# Patient Record
Sex: MALE | Race: BLACK OR AFRICAN AMERICAN
[De-identification: names, ages, dates, MRNs, and addresses within clinical notes are randomized per-mention and may not be internally consistent; named-entity substitution may affect disease eponyms.]

---

## 2019-06-27 ENCOUNTER — HOSPITAL ENCOUNTER (EMERGENCY)
Dept: HOSPITAL 76 - ED | Age: 19
Discharge: HOME | End: 2019-06-27
Payer: COMMERCIAL

## 2019-06-27 ENCOUNTER — HOSPITAL ENCOUNTER (OUTPATIENT)
Dept: HOSPITAL 76 - EMS | Age: 19
Discharge: ANOTHER HOSPITAL | End: 2019-06-27
Attending: SURGERY
Payer: COMMERCIAL

## 2019-06-27 VITALS — SYSTOLIC BLOOD PRESSURE: 157 MMHG | DIASTOLIC BLOOD PRESSURE: 88 MMHG

## 2019-06-27 DIAGNOSIS — R00.0: ICD-10-CM

## 2019-06-27 DIAGNOSIS — R09.02: ICD-10-CM

## 2019-06-27 DIAGNOSIS — Z72.0: ICD-10-CM

## 2019-06-27 DIAGNOSIS — R06.00: Primary | ICD-10-CM

## 2019-06-27 DIAGNOSIS — J18.9: Primary | ICD-10-CM

## 2019-06-27 DIAGNOSIS — I49.1: ICD-10-CM

## 2019-06-27 DIAGNOSIS — R07.9: ICD-10-CM

## 2019-06-27 DIAGNOSIS — R19.7: ICD-10-CM

## 2019-06-27 LAB
ALBUMIN DIAFP-MCNC: 4.3 G/DL (ref 3.2–5.5)
ALBUMIN/GLOB SERPL: 1.2 {RATIO} (ref 1–2.2)
ALP SERPL-CCNC: 46 IU/L (ref 42–121)
ALT SERPL W P-5'-P-CCNC: 20 IU/L (ref 10–60)
ANION GAP SERPL CALCULATED.4IONS-SCNC: 9 MMOL/L (ref 6–13)
AST SERPL W P-5'-P-CCNC: 22 IU/L (ref 10–42)
BASOPHILS NFR BLD AUTO: 0.1 10^3/UL (ref 0–0.1)
BASOPHILS NFR BLD AUTO: 0.8 %
BILIRUB BLD-MCNC: 1.4 MG/DL (ref 0.2–1)
BUN SERPL-MCNC: 14 MG/DL (ref 6–20)
CALCIUM UR-MCNC: 9.4 MG/DL (ref 8.5–10.3)
CHLORIDE SERPL-SCNC: 105 MMOL/L (ref 101–111)
CO2 SERPL-SCNC: 24 MMOL/L (ref 21–32)
CREAT SERPLBLD-SCNC: 1.1 MG/DL (ref 0.6–1.2)
EOSINOPHIL # BLD AUTO: 0.2 10^3/UL (ref 0–0.7)
EOSINOPHIL NFR BLD AUTO: 1.3 %
ERYTHROCYTE [DISTWIDTH] IN BLOOD BY AUTOMATED COUNT: 12.8 % (ref 12–15)
GFRSERPLBLD MDRD-ARVRAT: 103 ML/MIN/{1.73_M2} (ref 89–?)
GLOBULIN SER-MCNC: 3.7 G/DL (ref 2.1–4.2)
GLUCOSE SERPL-MCNC: 97 MG/DL (ref 70–100)
HGB UR QL STRIP: 16.3 G/DL (ref 14–18)
LIPASE SERPL-CCNC: 24 U/L (ref 22–51)
LYMPHOCYTES # SPEC AUTO: 2.2 10^3/UL (ref 1.5–3.5)
LYMPHOCYTES NFR BLD AUTO: 12.6 %
MCH RBC QN AUTO: 31.2 PG (ref 27–31)
MCHC RBC AUTO-ENTMCNC: 36 G/DL (ref 32–36)
MCV RBC AUTO: 86.6 FL (ref 80–94)
MONOCYTES # BLD AUTO: 0.7 10^3/UL (ref 0–1)
MONOCYTES NFR BLD AUTO: 4.3 %
NEUTROPHILS # BLD AUTO: 13.8 10^3/UL (ref 1.5–6.6)
NEUTROPHILS # SNV AUTO: 17.2 X10^3/UL (ref 4.8–10.8)
NEUTROPHILS NFR BLD AUTO: 80.4 %
PDW BLD AUTO: 12.2 FL (ref 7.4–11.4)
PLATELET # BLD: 262 10^3/UL (ref 130–450)
PROT SPEC-MCNC: 8 G/DL (ref 6.7–8.2)
RBC MAR: 5.23 10^6/UL (ref 4.7–6.1)
SODIUM SERPLBLD-SCNC: 138 MMOL/L (ref 135–145)

## 2019-06-27 PROCEDURE — 96365 THER/PROPH/DIAG IV INF INIT: CPT

## 2019-06-27 PROCEDURE — 84484 ASSAY OF TROPONIN QUANT: CPT

## 2019-06-27 PROCEDURE — 96367 TX/PROPH/DG ADDL SEQ IV INF: CPT

## 2019-06-27 PROCEDURE — 93005 ELECTROCARDIOGRAM TRACING: CPT

## 2019-06-27 PROCEDURE — 80053 COMPREHEN METABOLIC PANEL: CPT

## 2019-06-27 PROCEDURE — 83690 ASSAY OF LIPASE: CPT

## 2019-06-27 PROCEDURE — 85025 COMPLETE CBC W/AUTO DIFF WBC: CPT

## 2019-06-27 PROCEDURE — 71046 X-RAY EXAM CHEST 2 VIEWS: CPT

## 2019-06-27 PROCEDURE — 36415 COLL VENOUS BLD VENIPUNCTURE: CPT

## 2019-06-27 PROCEDURE — 96375 TX/PRO/DX INJ NEW DRUG ADDON: CPT

## 2019-06-27 PROCEDURE — 71275 CT ANGIOGRAPHY CHEST: CPT

## 2019-06-27 PROCEDURE — 94640 AIRWAY INHALATION TREATMENT: CPT

## 2019-06-27 PROCEDURE — 99284 EMERGENCY DEPT VISIT MOD MDM: CPT

## 2019-06-27 NOTE — CT REPORT
Reason:  SOB, hypoxia, concern for PE

Procedure Date:  06/27/2019   

Accession Number:  [id number] / [id number]                    

Procedure:  CT  - ANGIO CHEST W/WO CPT Code:  

 

FULL RESULT:

 

 

EXAM:

CT ANGIOGRAM CHEST

 

EXAM DATE: 6/27/2019 03:55 PM.

 

CLINICAL HISTORY: Shortness of breath. Hypoxia.

 

COMPARISON: CHEST 2 VIEW 06/27/2019 1:05 PM.

 

TECHNIQUE: Routine helical imaging was performed through the chest in the 

pulmonary arterial phase. IV Contrast: 70 cc of Optiray 320. 

Reconstructions: Coronal 3-D MIP reconstructions.Sagittal and coronal.

 

In accordance with CT protocol optimization, one or more of the following 

dose reduction techniques were utilized for this exam: automated exposure 

control, adjustment of mA and/or KV based on patient size, or use of 

iterative reconstructive technique.

 

FINDINGS:

Pulmonary Arteries:

Diagnostic quality: Adequate through the segmental arteries. No evidence 

for acute or chronic pulmonary emboli.

 

RV/LV is within normal limits. There is no interventricular septal 

bowing. There is no reflux of contrast material in the IVC.

 

Lungs/Pleura: Small patchy infiltrates in the medial perihilar right 

upper lobe, lingula, central right lower lobe and left lower lobe, and 

medial right middle lobe. No consolidation, nodules, or edema. No 

effusions or pneumothorax.

 

Mediastinum: Normal. No cardiac enlargement or adenopathy.

 

Thoracic Aorta: Unremarkable.

 

Upper Abdomen: Unremarkable.

 

Other: None.

IMPRESSION:

1. No pulmonary emboli.

2. No aortic aneurysm.

3. Patchy widespread bilateral pulmonary infiltrates.

 

[facility]

## 2019-06-27 NOTE — XRAY REPORT
Reason:  cough

Procedure Date:  06/27/2019   

Accession Number:  [id number] / [id number]                    

Procedure:  XR  - Chest 2 View X-Ray CPT Code:  75917

 

FULL RESULT:

 

 

EXAM:

CHEST RADIOGRAPHY

 

EXAM DATE: 6/27/2019 01:21 PM.

 

CLINICAL HISTORY: Cough.

 

COMPARISON: None.

 

TECHNIQUE: 2 views.

 

FINDINGS:

Lungs/Pleura: Bilateral airway thickening can be seen in the setting of 

bronchiolitis or reactive airways disease. No focal lung parenchymal 

consolidation identified to suggest superimposed pneumonia. No pleural 

effusion or pneumothorax.

 

Mediastinum: There is increasing density in both hilar regions suggestive 

of adenopathy. There is no cardiomegaly.

 

Other: None.

IMPRESSION: Radiograph suggestive of airway thickening and bilateral 

lymphadenopathy.

 

[facility]

## 2019-06-27 NOTE — ED PHYSICIAN DOCUMENTATION
History of Present Illness





- Stated complaint


Stated Complaint: DIFF BREATHING





- History obtained from


History obtained from: Patient





- History of Present Illness


Timing: Prior to arrival





- Additonal information


Additional information: 





Patient is a previously healthy 18-year-old male [place] 

with [place] breathing and mild hypoxia.  Patient denies any history of 

asthma or other pulmonary issues.  Patient states that he was generally feeling 

unwell over the last day or so and thought he may have had a panic attack last 

night and therefore [place] an appointment [place] 

[place].  Patient reports productive cough, difficulty breathing, but no fever, 

chills, or chest pain.  Patient also denies abdominal pain or urinary changes, 

but reports mild diarrhea.Patient received nebulizer treatment in route, which 

he reports improved his symptoms.No other improving or worsening factors noted.





Review of Systems


Constitutional: denies: Fever


Respiratory: reports: Dyspnea, Cough


GI: reports: Diarrhea.  denies: Abdominal Pain, Vomiting


: denies: Dysuria





PD PAST MEDICAL HISTORY





- Past Medical History


Past Medical History: No





- Past Surgical History


Past Surgical History: No





- Present Medications


Home Medications: 


                                Ambulatory Orders











 Medication  Instructions  Recorded  Confirmed


 


Azithromycin 250 mg PO DAILY 4 Days #4 tab 06/27/19 














- Allergies


Allergies/Adverse Reactions: 


                                    Allergies











Allergy/AdvReac Type Severity Reaction Status Date / Time


 


No Known Drug Allergies Allergy   Verified 06/27/19 12:42














PD ED PE NORMAL





- Vitals


Vital signs reviewed: Yes





- General


General: Alert and oriented X 3, No acute distress, Well developed/nourished, 

Other (Speaking in full sentences)





- HEENT


HEENT: Atraumatic, Moist mucous membranes, Pharynx benign





- Cardiac


Cardiac: RRR, No murmur





- Respiratory


Respiratory: No respiratory distress.  No: Clear bilaterally (Breath sounds 

present bilaterally but with significant expiratory wheezing throughout)





- Abdomen


Abdomen: Soft, Non tender, Non distended





- Derm


Derm: Normal color, Warm and dry, No rash





- Extremities


Extremities: No deformity, No tenderness to palpate





- Neuro


Neuro: Alert and oriented X 3, No motor deficit, No sensory deficit





- Psych


Psych: Normal mood, Normal affect





Results





- Vitals


Vitals: 


                               Vital Signs - 24 hr











  06/27/19 06/27/19 06/27/19





  12:39 13:20 13:24


 


Temperature 37.2 C  


 


Heart Rate 103 H 100 96


 


Respiratory 28 H 15 21





Rate   


 


Blood Pressure 156/75 H  


 


O2 Saturation 90 L 95 














  06/27/19 06/27/19 06/27/19





  14:20 14:34 14:39


 


Temperature 36.7 C  


 


Heart Rate 89 86 


 


Respiratory 20 19 





Rate   


 


Blood Pressure 146/61 H  


 


O2 Saturation 90 L  94














  06/27/19 06/27/19 06/27/19





  16:00 17:03 17:26


 


Temperature   


 


Heart Rate 93 89 


 


Respiratory 18 18 





Rate   


 


Blood Pressure 146/89 H 141/94 H 


 


O2 Saturation 93 92 92








                                     Oxygen











O2 Source                      Nasal cannula


 


Oxygen Flow Rate               3

















- EKG (time done)


  ** 1234


Rate: Rate (enter#) (103)


Rhythm: NSR


Ischemia: Non specific changes





- Labs


Labs: 


                                Laboratory Tests











  06/27/19 06/27/19 06/27/19





  14:30 14:30 14:30


 


WBC  17.2 H  


 


RBC  5.23  


 


Hgb  16.3  


 


Hct  45.3  


 


MCV  86.6  


 


MCH  31.2 H  


 


MCHC  36.0  


 


RDW  12.8  


 


Plt Count  262  


 


MPV  12.2 H  


 


Neut # (Auto)  13.8 H  


 


Lymph # (Auto)  2.2  


 


Mono # (Auto)  0.7  


 


Eos # (Auto)  0.2  


 


Baso # (Auto)  0.1  


 


Absolute Nucleated RBC  0.00  


 


Nucleated RBC %  0.0  


 


Sodium   138 


 


Potassium   3.8 


 


Chloride   105 


 


Carbon Dioxide   24 


 


Anion Gap   9.0 


 


BUN   14 


 


Creatinine   1.1 


 


Estimated GFR (MDRD)   103 


 


Glucose   97 


 


Calcium   9.4 


 


Total Bilirubin   1.4 H 


 


AST   22 


 


ALT   20 


 


Alkaline Phosphatase   46 


 


Troponin I    < 0.04


 


Total Protein   8.0 


 


Albumin   4.3 


 


Globulin   3.7 


 


Albumin/Globulin Ratio   1.2 


 


Lipase   24 














PD MEDICAL DECISION MAKING





- ED course


Complexity details: reviewed results, re-evaluated patient, considered 

differential, d/w patient


ED course: 





Patient presenting with hypoxia and difficulty breathing without known 

exposures, although patient later admits to usage of vape and tobacco.  Patient 

denies history of asthma, but has significant wheezing both inspiratory and 

expiratory on exam.  Patient received an nebulizer treatment in route and 

received several more in addition to magnesium and Solu-Medrol in the ED.  Chest

x-ray found inflammatory changes likely reactive.  Patient continued to require 

supplemental oxygen and given his continued knee, felt appropriate to further 

evaluate.  EKG did not find evidence of ischemic changes.  Screening lab work 

including troponin returned relatively unremarkable except for leukocytosis.  CT

angios chest obtained to further evaluate for etiologies including PE.  CT found

evidence of bilateral patchy infiltrates, but no PE.Patient started on both 

Cipro and azithromycin in the ED and eventually weaned off of oxygen.  Discussed

results and recommendations with patient including use of antibiotics at home, 

strict return precautions, and need for close follow-up with his primary care 

physician.  Patient voiced understanding and is comfortable with discharge plan.





Departure





- Departure


Disposition: 01 Home, Self Care


Clinical Impression: 


Pneumonia


Qualifiers:


 Pneumonia type: due to unspecified organism Laterality: bilateral Lung 

location: unspecified part of lung Qualified Code(s): J18.9 - Pneumonia, 

unspecified organism





Condition: Good


Instructions:  ED Pneumonia Adult


Follow-Up: 


your,doctor [Other] - Within 3 Days


Prescriptions: 


Azithromycin 250 mg PO DAILY 4 Days #4 tab


Comments: 


Please start taking azithromycin as prescribed tomorrow as you have received 

your antibiotics for today in the ED.  Please follow-up closely with your 

primary care physician in the next 1 to 2 days.  Return to ED sooner if 

experience any worsening symptoms or have other concerns.

## 2019-07-06 ENCOUNTER — HOSPITAL ENCOUNTER (EMERGENCY)
Dept: HOSPITAL 76 - ED | Age: 19
Discharge: HOME | End: 2019-07-06
Payer: COMMERCIAL

## 2019-07-06 VITALS — SYSTOLIC BLOOD PRESSURE: 137 MMHG | DIASTOLIC BLOOD PRESSURE: 90 MMHG

## 2019-07-06 DIAGNOSIS — J18.9: Primary | ICD-10-CM

## 2019-07-06 PROCEDURE — 94664 DEMO&/EVAL PT USE INHALER: CPT

## 2019-07-06 PROCEDURE — 94640 AIRWAY INHALATION TREATMENT: CPT

## 2019-07-06 PROCEDURE — 99284 EMERGENCY DEPT VISIT MOD MDM: CPT

## 2019-07-06 PROCEDURE — 71046 X-RAY EXAM CHEST 2 VIEWS: CPT

## 2019-07-06 PROCEDURE — 99283 EMERGENCY DEPT VISIT LOW MDM: CPT

## 2019-07-06 NOTE — ED PHYSICIAN DOCUMENTATION
PD HPI URI





- Stated complaint


Stated Complaint: SOA





- Chief complaint


Chief Complaint: Resp





- History obtained from


History obtained from: Patient





- History of Present Illness


Timing - onset: How many days ago (2 days of increased cough and trouble 

breathing after finishing abx. Had cough and dyspnea the past 10-14 days prior 

to that and was seen in ER a week ago, and Rx Zpack after Dx of pneumonia based 

on chest CT/CXR.)


Timing duration: Weeks (2)


Timing details: Gradual onset, Still present, Waxing and waning (was feeling 

better with abx but not all improved, and then symptoms again the past 2 days.)


Associated symptoms: Productive cough, Dyspnea.  No: Fever, Swollen nodes, 

Hemoptysis, NVD


Contributing factors: COPD / asthma.  No: Sick contact


Similar symptoms before: Has not had sx before


Recently seen: Emergency Dept (a week ago with Dx of pneumonia, with nebulizers 

x 3 in ER, and Rx zpack (no inhaler nor steroids))





Review of Systems


Constitutional: reports: Myalgias.  denies: Fever, Chills


Nose: reports: Congestion.  denies: Rhinorrhea / runny nose


Throat: denies: Sore throat


Cardiac: denies: Chest pain / pressure, Palpitations


Respiratory: reports: Dyspnea, Cough, Wheezing


GI: denies: Nausea, Vomiting, Diarrhea


Skin: denies: Rash, Lesions





PD PAST MEDICAL HISTORY





- Past Medical History


Respiratory: Pneumonia





- Past Surgical History


Past Surgical History: No





- Present Medications


Home Medications: 


                                Ambulatory Orders











 Medication  Instructions  Recorded  Confirmed


 


Albuterol Sulf [Ventolin Hfa 2 - 3 puffs INH Q4HR PRN #1 inhaler 07/06/19 





Inhaler]   


 


Benzonatate [Tessalon Perle] 100 mg PO TID PRN #20 capsule 07/06/19 


 


Doxycycline Hyclate 100 mg PO BID #14 capsule 07/06/19 


 


dexAMETHasone [Decadron] 4 mg PO DAILY #5 tablet 07/06/19 














- Allergies


Allergies/Adverse Reactions: 


                                    Allergies











Allergy/AdvReac Type Severity Reaction Status Date / Time


 


No Known Drug Allergies Allergy   Verified 07/06/19 09:04














- Social History


Does the pt smoke?: No


Smoking Status: Never smoker


Does the pt drink ETOH?: No


Does the pt have substance abuse?: No





- Immunizations


Immunizations are current?: Yes





- POLST


Patient has POLST: No





PD ED PE NORMAL





- Vitals


Vital signs reviewed: Yes





- General


General: Alert and oriented X 3, Well developed/nourished, Other (seems tight 

breathing and some work of breathing. )





- HEENT


HEENT: Ears normal, Pharynx benign





- Neck


Neck: Supple, no meningeal sign, No adenopathy





- Cardiac


Cardiac: RRR, No murmur





- Respiratory


Respiratory: No: Clear bilaterally (no coarse sounds, but with diffuse moderate 

wheezing with diminished tidal volume. )





- Derm


Derm: Normal color, Warm and dry





- Extremities


Extremities: No tenderness to palpate, Normal ROM s pain, No edema, No calf 

tenderness / cord





- Neuro


Neuro: Alert and oriented X 3, No motor deficit, Normal speech





Results





- Vitals


Vitals: 


                               Vital Signs - 24 hr











  07/06/19 07/06/19 07/06/19





  09:01 09:33 10:18


 


Temperature 36.8 C  


 


Heart Rate 85 89 83


 


Respiratory 20 24 22





Rate   


 


Blood Pressure 161/74 H 135/100 H 


 


O2 Saturation 97 95 














  07/06/19 07/06/19





  10:47 11:41


 


Temperature  


 


Heart Rate 83 79


 


Respiratory 20 20





Rate  


 


Blood Pressure  137/90 H


 


O2 Saturation  96








                                     Oxygen











O2 Source                      Room air

















- Rads (name of study)


  ** chest xray


Radiology: Prelim report reviewed (no infiltrates), See rad report





PD MEDICAL DECISION MAKING





- ED course


Complexity details: reviewed results (no pneumonia), re-evaluated patient (much 

improved breathing after 2 nebs. Feels better. ), considered differential, d/w 

patient





Departure





- Departure


Disposition: 01 Home, Self Care


Clinical Impression: 


Pneumonia


Qualifiers:


 Pneumonia type: due to unspecified organism Laterality: unspecified laterality 

Lung location: unspecified part of lung Qualified Code(s): J18.9 - Pneumonia, 

unspecified organism





Dyspnea


Qualifiers:


 Dyspnea type: shortness of breath Qualified Code(s): R06.02 - Shortness of 

breath





Condition: Stable


Record reviewed to determine appropriate education?: Yes


Instructions:  ED Bronchitis Asthmatic


Follow-Up: 


CARLTON MCCALL MD [Primary Care Provider] - 


Prescriptions: 


Albuterol Sulf [Ventolin Hfa Inhaler] 2 - 3 puffs INH Q4HR PRN #1 inhaler


 PRN Reason: Shortness Of Air/Wheezing


Benzonatate [Tessalon Perle] 100 mg PO TID PRN #20 capsule


 PRN Reason: Cough


dexAMETHasone [Decadron] 4 mg PO DAILY #5 tablet


Doxycycline Hyclate 100 mg PO BID #14 capsule


Comments: 


Use the albuterol inhaler 2 puffs 4 times a day regularly for the next 7 to 10 

days and extra times as needed.  Decadron steroid for inflammation of the 

airways daily for 5 more days.  Doxycycline antibiotic for a week.  Add Tessalon

 if needed for cough suppression.  Stay well-hydrated.  Off work for a day or 2.

  Recheck if not improving well over the next couple of days.


Forms:  Activity restrictions


Discharge Date/Time: 07/06/19 12:04

## 2019-07-06 NOTE — XRAY REPORT
Reason:  dyspnea/ cough

Procedure Date:  07/06/2019   

Accession Number:  [id number] / [id number]                    

Procedure:  XR  - Chest 2 View X-Ray CPT Code:  84122

 

FULL RESULT:

 

 

EXAM:

CHEST RADIOGRAPHY

 

EXAM DATE: 7/6/2019 10:15 AM.

 

CLINICAL HISTORY: Dyspnea/ cough.

 

COMPARISON: CHEST 2 VIEW 06/27/2019 1:05 PM

CHEST ANGIO 06/27/2019 3:37 PM.

 

TECHNIQUE: 2 views.

 

FINDINGS:

Lungs/Pleura: No focal consolidation. Previous subtle lingular and right 

middle lobe opacities have improved. No pleural effusion. No 

pneumothorax. Normal volumes.

 

Mediastinum: Heart and mediastinal contours are normal.

 

Other: None.

IMPRESSION: No acute cardiopulmonary abnormality. Improved pulmonary 

opacities from prior.

 

[facility]

## 2019-10-15 ENCOUNTER — HOSPITAL ENCOUNTER (EMERGENCY)
Dept: HOSPITAL 76 - ED | Age: 19
Discharge: HOME | End: 2019-10-15
Payer: COMMERCIAL

## 2019-10-15 VITALS — DIASTOLIC BLOOD PRESSURE: 79 MMHG | SYSTOLIC BLOOD PRESSURE: 128 MMHG

## 2019-10-15 DIAGNOSIS — A05.9: Primary | ICD-10-CM

## 2019-10-15 LAB
ALBUMIN DIAFP-MCNC: 4.3 G/DL (ref 3.2–5.5)
ALBUMIN/GLOB SERPL: 1.3 {RATIO} (ref 1–2.2)
ALP SERPL-CCNC: 52 IU/L (ref 42–121)
ALT SERPL W P-5'-P-CCNC: 26 IU/L (ref 10–60)
ANION GAP SERPL CALCULATED.4IONS-SCNC: 11 MMOL/L (ref 6–13)
AST SERPL W P-5'-P-CCNC: 23 IU/L (ref 10–42)
BASOPHILS NFR BLD AUTO: 0.1 10^3/UL (ref 0–0.1)
BASOPHILS NFR BLD AUTO: 0.9 %
BILIRUB BLD-MCNC: 0.9 MG/DL (ref 0.2–1)
BUN SERPL-MCNC: 11 MG/DL (ref 6–20)
CALCIUM UR-MCNC: 9.6 MG/DL (ref 8.5–10.3)
CHLORIDE SERPL-SCNC: 104 MMOL/L (ref 101–111)
CO2 SERPL-SCNC: 27 MMOL/L (ref 21–32)
CREAT SERPLBLD-SCNC: 1.2 MG/DL (ref 0.6–1.2)
EOSINOPHIL # BLD AUTO: 0.6 10^3/UL (ref 0–0.7)
EOSINOPHIL NFR BLD AUTO: 5.1 %
ERYTHROCYTE [DISTWIDTH] IN BLOOD BY AUTOMATED COUNT: 12.8 % (ref 12–15)
GFRSERPLBLD MDRD-ARVRAT: 93 ML/MIN/{1.73_M2} (ref 89–?)
GLOBULIN SER-MCNC: 3.2 G/DL (ref 2.1–4.2)
GLUCOSE SERPL-MCNC: 97 MG/DL (ref 70–100)
HGB UR QL STRIP: 15.5 G/DL (ref 14–18)
LIPASE SERPL-CCNC: 25 U/L (ref 22–51)
LYMPHOCYTES # SPEC AUTO: 2 10^3/UL (ref 1.5–3.5)
LYMPHOCYTES NFR BLD AUTO: 17.6 %
MCH RBC QN AUTO: 31.1 PG (ref 27–31)
MCHC RBC AUTO-ENTMCNC: 34.8 G/DL (ref 32–36)
MCV RBC AUTO: 89.4 FL (ref 80–94)
MONOCYTES # BLD AUTO: 1 10^3/UL (ref 0–1)
MONOCYTES NFR BLD AUTO: 8.8 %
NEUTROPHILS # BLD AUTO: 7.8 10^3/UL (ref 1.5–6.6)
NEUTROPHILS # SNV AUTO: 11.6 X10^3/UL (ref 4.8–10.8)
NEUTROPHILS NFR BLD AUTO: 67.2 %
PDW BLD AUTO: 11.6 FL (ref 7.4–11.4)
PLATELET # BLD: 265 10^3/UL (ref 130–450)
PROT SPEC-MCNC: 7.5 G/DL (ref 6.7–8.2)
RBC MAR: 4.99 10^6/UL (ref 4.7–6.1)
SODIUM SERPLBLD-SCNC: 142 MMOL/L (ref 135–145)

## 2019-10-15 PROCEDURE — 85025 COMPLETE CBC W/AUTO DIFF WBC: CPT

## 2019-10-15 PROCEDURE — 83690 ASSAY OF LIPASE: CPT

## 2019-10-15 PROCEDURE — 99283 EMERGENCY DEPT VISIT LOW MDM: CPT

## 2019-10-15 PROCEDURE — 36415 COLL VENOUS BLD VENIPUNCTURE: CPT

## 2019-10-15 PROCEDURE — 80053 COMPREHEN METABOLIC PANEL: CPT

## 2019-10-15 NOTE — ED PHYSICIAN DOCUMENTATION
PD HPI NVD





- Stated complaint


Stated Complaint: V/D





- Chief complaint


Chief Complaint: Abd Pain





- History obtained from


History obtained from: Patient





- History of Present Illness


Timing - onset: Last night (He and his significant other became sick several 

hours after eating a burrito at [employer] last night.  He is had vomiting and 

diarrhea.  He is improving.  His central and upper abdominal pain.  No fevers.  

No recent travel.  His significant other also had similar symptoms. On the time 

of my evaluation he is already been treated with Zofran and Imodium.  He is 

already passed a p.o. challenge.)





Review of Systems


Ten Systems: 10 systems reviewed and negative


Constitutional: denies: Fever, Chills


Respiratory: reports: Reviewed and negative


GI: denies: Abdominal Swelling, Constipation, Hematemesis, Bloody / black stool





PD PAST MEDICAL HISTORY





- Past Medical History


Respiratory: Pneumonia





- Past Surgical History


Past Surgical History: No





- Present Medications


Home Medications: 


                                Ambulatory Orders











 Medication  Instructions  Recorded  Confirmed


 


Albuterol Sulf [Ventolin Hfa 2 - 3 puffs INH Q4HR PRN #1 inhaler 07/06/19 





Inhaler]   


 


Benzonatate [Tessalon Perle] 100 mg PO TID PRN #20 capsule 07/06/19 


 


Doxycycline Hyclate 100 mg PO BID #14 capsule 07/06/19 


 


dexAMETHasone [Decadron] 4 mg PO DAILY #5 tablet 07/06/19 


 


Loperamide [Imodium] 2 mg PO QID PRN #10 capsule 10/15/19 


 


Ondansetron Odt [Zofran] 4 mg TL Q6H PRN #10 tablet 10/15/19 














- Allergies


Allergies/Adverse Reactions: 


                                    Allergies











Allergy/AdvReac Type Severity Reaction Status Date / Time


 


No Known Drug Allergies Allergy   Verified 10/15/19 13:33














- Social History


Does the pt smoke?: No


Smoking Status: Never smoker


Does the pt drink ETOH?: No


Does the pt have substance abuse?: No





- Immunizations


Immunizations are current?: Yes





- POLST


Patient has POLST: No





PD ED PE NORMAL





- Vitals


Vital signs reviewed: Yes





- General


General: Alert and oriented X 3, No acute distress





- Abdomen


Abdomen: Normal bowel sounds, Soft, Non tender





- Neuro


Neuro: Alert and oriented X 3, Normal speech





- Psych


Psych: Normal mood, Normal affect





Results





- Vitals


Vitals: 





                               Vital Signs - 24 hr











  10/15/19





  13:33


 


Temperature 37.1 C


 


Heart Rate 80


 


Respiratory 16





Rate 


 


Blood Pressure 128/79


 


O2 Saturation 100








                                     Oxygen











O2 Source                      Room air

















- Labs


Labs: 





                                Laboratory Tests











  10/15/19 10/15/19





  14:09 14:09


 


WBC  11.6 H 


 


RBC  4.99 


 


Hgb  15.5 


 


Hct  44.6 


 


MCV  89.4 


 


MCH  31.1 H 


 


MCHC  34.8 


 


RDW  12.8 


 


Plt Count  265 


 


MPV  11.6 H 


 


Neut # (Auto)  7.8 H 


 


Lymph # (Auto)  2.0 


 


Mono # (Auto)  1.0 


 


Eos # (Auto)  0.6 


 


Baso # (Auto)  0.1 


 


Absolute Nucleated RBC  0.00 


 


Nucleated RBC %  0.0 


 


Sodium   142


 


Potassium   4.1


 


Chloride   104


 


Carbon Dioxide   27


 


Anion Gap   11.0


 


BUN   11


 


Creatinine   1.2


 


Estimated GFR (MDRD)   93


 


Glucose   97


 


Calcium   9.6


 


Total Bilirubin   0.9


 


AST   23


 


ALT   26


 


Alkaline Phosphatase   52


 


Total Protein   7.5


 


Albumin   4.3


 


Globulin   3.2


 


Albumin/Globulin Ratio   1.3


 


Lipase   25














PD MEDICAL DECISION MAKING





- ED course


ED course: 





19-year-old gentleman with gastroenteritis versus food poisoning.  There is no 

abdominal tenderness but he was given appendicitis precautions.





Departure





- Departure


Disposition: 01 Home, Self Care


Clinical Impression: 


 Food poisoning





Condition: Good


Record reviewed to determine appropriate education?: Yes


Instructions:  ED Vomiting Diarrhea Nonspecific Ad


Prescriptions: 


Loperamide [Imodium] 2 mg PO QID PRN #10 capsule


 PRN Reason: Diarrhea


Ondansetron Odt [Zofran] 4 mg TL Q6H PRN #10 tablet


 PRN Reason: Nausea / Vomiting


Comments: 


Return if not better tomorrow morning, anytime if worse, if you develop a fever.

 Or if pain moved to the right lower quadrant where I showed you.


Forms:  Activity restrictions

## 2021-02-24 ENCOUNTER — HOSPITAL ENCOUNTER (EMERGENCY)
Dept: HOSPITAL 76 - ED | Age: 21
Discharge: HOME | End: 2021-02-24
Payer: COMMERCIAL

## 2021-02-24 VITALS — DIASTOLIC BLOOD PRESSURE: 82 MMHG | SYSTOLIC BLOOD PRESSURE: 165 MMHG

## 2021-02-24 DIAGNOSIS — Z76.0: ICD-10-CM

## 2021-02-24 DIAGNOSIS — J45.909: Primary | ICD-10-CM

## 2021-02-24 DIAGNOSIS — Z79.51: ICD-10-CM

## 2021-02-24 PROCEDURE — 94664 DEMO&/EVAL PT USE INHALER: CPT

## 2021-02-24 PROCEDURE — 94640 AIRWAY INHALATION TREATMENT: CPT

## 2021-02-24 PROCEDURE — 99283 EMERGENCY DEPT VISIT LOW MDM: CPT
